# Patient Record
Sex: MALE | Race: WHITE | NOT HISPANIC OR LATINO | URBAN - METROPOLITAN AREA
[De-identification: names, ages, dates, MRNs, and addresses within clinical notes are randomized per-mention and may not be internally consistent; named-entity substitution may affect disease eponyms.]

---

## 2019-08-08 NOTE — PROGRESS NOTES
Alireza C Wood / 29 y.o. / male  Encounter Date: 08/09/2019    ASSESSMENT & PLAN:    Problem List Items Addressed This Visit     None      Visit Diagnoses     Encounter to establish care    -  Primary    Gastroesophageal reflux disease without esophagitis        Relevant Medications    pantoprazole (PROTONIX) 20 MG EC tablet    Other Relevant Orders    Ambulatory Referral to Gastroenterology    Obesity (BMI 35.0-39.9 without comorbidity)            Orders Placed This Encounter   Procedures   • Ambulatory Referral to Gastroenterology     New Medications Ordered This Visit   Medications   • pantoprazole (PROTONIX) 20 MG EC tablet     Sig: Take 1 tablet by mouth Daily.     Dispense:  30 tablet     Refill:  0       Summary/Discussion:  1.  Instructed patient to follow a high-fiber diet approximately 30 g of fiber per day, handout provided and reviewed with patient regarding high-fiber foods and fiber supplements.  2.  Referral given for GI, to assess history of hiatal hernia, reflux, questionable IBS, family history of IBS.  3.  Start Protonix once daily for complaint of heartburn and reflux problems.  I have advised patient that discontinuing caffeine, alcohol, smoking is going to be imperative for his symptoms of reflux, and esophageal irritation.  Also that weight loss is advised for these issues as well.  4.  Discussion and referral placed for weight loss management with [facility].  Reviewed the details of weight loss management and health coaching with the patient regarding clinic outline and benefits.  5. Recommended he follow up with me within the next 1 month for CPE with fasting labs prior: CBC, CMP, A1c, lipid panel, TSH/T4, urine with micro    Return in about 4 weeks (around 9/6/2019) for Annual physical with fasting last prior.  ________________________________________________________________    VITALS:    Visit Vitals  /80   Pulse 81   Temp 96.4 °F (35.8 °C) (Temporal)   Ht 170.2 cm  "(67\")   Wt 104 kg (230 lb)   SpO2 98%   BMI 36.02 kg/m²       BP Readings from Last 3 Encounters:   08/09/19 126/80     Wt Readings from Last 3 Encounters:   08/09/19 104 kg (230 lb)      Body mass index is 36.02 kg/m².    CC: Main reason(s) for today's visit: Establish Care; Abdominal Pain; Diarrhea; Dizziness; and Heartburn      HPI    Patient is a 29 y.o. male who is here to establish care with new PCP and for preventive medicine service visit.  He is a new patient to me.    Concerns today include:     Reflux/heartburn/IBS: Patient complains of recurrent reflux, heartburn symptoms onset greater than 2 years ago.  He reports burning and fullness discomfort in his epigastric area, concurrent with eating or drinking.  Symptoms are exacerbated by laying down or bending forward.  He is not currently exercising.  He does partake in daily alcoholic beverages and caffeine.  He is a current everyday smoker.  He is also overweight.  Is a family history of IBS, and reports that he has a tendency towards loose bowels, with occasional diarrhea, that has been persistent the majority of his life.  He does have a history of a hiatal hernia, with reported vomiting of blood in 2015 for which he was worked up at [facility], and I do not have those records.    Obesity: His BMI is currently 36.  He reports that he has gained significant weight since he stopped taking Adderall over 1 year ago.  He was on Adderall for history of ADHD as diagnosed and managed by psychiatry in the past.  He reports that he does not exercise at this time, and is not conscientious about his food intake.  He is requesting nutritional guidance regarding exercise and healthy eating.    Bipolar 1 disorder: He has an established diagnosis of bipolar disorder, and a family history of bipolar disorder.  He is on Lamictal daily dosing as prescribed by psychiatry.  He is currently in between psychiatrist, is exploring psychiatry establishment with [facility] " psychiatry group.  He reports that he has sufficient Lamictal refills at this time.  He has not had any manic episodes for greater than 5 years, confirmed by girlfriend.  He states that he does have a history of substance abuse greater than 5 years ago.  He appears stable in conversation today in office.  He does have current substance use of caffeine, alcohol, marijuana, and nicotine.    Patient Care Team:  Lesa Edwards APRN as PCP - General (Nurse Practitioner)  ____________________________________________________________________    REVIEW OF SYSTEMS    Review of Systems   Constitutional: Negative.  Negative for activity change, appetite change, diaphoresis, fatigue and unexpected weight change.   Eyes: Negative.    Respiratory: Negative.  Negative for shortness of breath.    Cardiovascular: Negative.  Negative for chest pain, palpitations and leg swelling.   Gastrointestinal: Positive for diarrhea and nausea. Negative for abdominal distention, abdominal pain, anal bleeding, blood in stool, constipation and vomiting.   Endocrine: Negative.  Negative for polydipsia, polyphagia and polyuria.   Genitourinary: Negative.    Musculoskeletal: Negative for back pain.   Skin: Negative.    Allergic/Immunologic: Negative for food allergies.   Neurological: Negative.  Negative for dizziness, weakness and headaches.   Psychiatric/Behavioral: Negative for agitation, dysphoric mood, self-injury, sleep disturbance and suicidal ideas. The patient is not nervous/anxious.        PHYSICAL EXAMINATION    Physical Exam   Constitutional: He is oriented to person, place, and time. He appears well-developed and well-nourished. No distress.   HENT:   Head: Normocephalic and atraumatic.   Eyes: Conjunctivae and EOM are normal. Pupils are equal, round, and reactive to light.   Neck: Normal range of motion.   Cardiovascular: Normal rate.   Pulmonary/Chest: Effort normal.   Abdominal: Soft. Bowel sounds are normal. He exhibits no distension  and no mass. There is no tenderness. There is no rebound and no guarding. No hernia.   Musculoskeletal: Normal range of motion.   Neurological: He is alert and oriented to person, place, and time. Coordination normal.   Skin: Skin is warm and dry.   Psychiatric: He has a normal mood and affect. His behavior is normal. Judgment and thought content normal.   Vitals reviewed.    REVIEWED DATA:    Labs:   No results found for: NA, K, AST, ALT, BUN, CREATININE, EGFRIFNONA, EGFRIFAFRI    No results found for: GLUCOSE, HGBA1C, MICROALBUR    No results found for: LDL, HDL, TRIG, CHOLHDLRATIO    No results found for: TSH, FREET4     No results found for: WBC, HGB, PLT      Imaging:      Medical Tests:      Summary of old records / correspondence / consultant report:      Request outside records:   ______________________________________________________________________    ALLERGIES  No Known Allergies     MEDICATIONS  Current Outpatient Medications on File Prior to Visit   Medication Sig   • lamoTRIgine  MG tablet sustained-release 24 hour Take 200 mg by mouth Daily.     No current facility-administered medications on file prior to visit.        PFSH:     The following portions of the patient's history were reviewed and updated as appropriate: Allergies / Current Medications / Past Medical History / Surgical History / Social History / Family History    PROBLEM LIST   Patient Active Problem List   Diagnosis   • Bipolar affective disorder (CMS/HCC)   • Panic disorder       PAST MEDICAL HISTORY  Past Medical History:   Diagnosis Date   • ADHD (attention deficit hyperactivity disorder)     tried aderall and vyvanse   • Bipolar 1 disorder (CMS/HCC)    • GERD (gastroesophageal reflux disease)        SURGICAL HISTORY  Past Surgical History:   Procedure Laterality Date   • ENDOSCOPY     • WISDOM TOOTH EXTRACTION         SOCIAL HISTORY  Social History     Socioeconomic History   • Marital status:      Spouse name: Not on  "file   • Number of children: Not on file   • Years of education: Not on file   • Highest education level: Not on file   Tobacco Use   • Smoking status: Current Every Day Smoker     Packs/day: 1.00     Years: 5.00     Pack years: 5.00     Types: Cigarettes   • Smokeless tobacco: Never Used   Substance and Sexual Activity   • Alcohol use: Yes     Alcohol/week: 4.2 oz     Types: 7 Shots of liquor per week     Comment: \"four marely\"    • Drug use: Yes     Types: Marijuana   • Sexual activity: Yes     Partners: Female     Birth control/protection: OCP       FAMILY HISTORY  Family History   Problem Relation Age of Onset   • Cancer Mother         ovarian   • Hypertension Father    • Bipolar disorder Father    • Post-traumatic stress disorder Father    • Diabetes Paternal Uncle    • Migraines Sister    • Heart disease Paternal Grandmother    • Colon cancer Neg Hx    • Colon polyps Neg Hx    • Breast cancer Neg Hx    • Prostate cancer Neg Hx    • Testicular cancer Neg Hx        "

## 2019-08-09 ENCOUNTER — OFFICE VISIT (OUTPATIENT)
Dept: INTERNAL MEDICINE | Age: 29
End: 2019-08-09

## 2019-08-09 VITALS
HEIGHT: 67 IN | SYSTOLIC BLOOD PRESSURE: 126 MMHG | BODY MASS INDEX: 36.1 KG/M2 | WEIGHT: 230 LBS | DIASTOLIC BLOOD PRESSURE: 80 MMHG | HEART RATE: 81 BPM | TEMPERATURE: 96.4 F | OXYGEN SATURATION: 98 %

## 2019-08-09 DIAGNOSIS — Z76.89 ENCOUNTER TO ESTABLISH CARE: Primary | ICD-10-CM

## 2019-08-09 DIAGNOSIS — K21.9 GASTROESOPHAGEAL REFLUX DISEASE WITHOUT ESOPHAGITIS: ICD-10-CM

## 2019-08-09 DIAGNOSIS — E66.9 OBESITY (BMI 35.0-39.9 WITHOUT COMORBIDITY): ICD-10-CM

## 2019-08-09 PROCEDURE — 99204 OFFICE O/P NEW MOD 45 MIN: CPT | Performed by: NURSE PRACTITIONER

## 2019-08-09 RX ORDER — PANTOPRAZOLE SODIUM 20 MG/1
20 TABLET, DELAYED RELEASE ORAL DAILY
Qty: 30 TABLET | Refills: 0 | Status: SHIPPED | OUTPATIENT
Start: 2019-08-09 | End: 2021-01-11 | Stop reason: SDUPTHER

## 2019-08-09 RX ORDER — LAMOTRIGINE 200 MG/1
200 TABLET, EXTENDED RELEASE ORAL DAILY
COMMUNITY
End: 2020-06-30 | Stop reason: SDUPTHER

## 2020-06-30 ENCOUNTER — OFFICE VISIT (OUTPATIENT)
Dept: INTERNAL MEDICINE | Age: 30
End: 2020-06-30

## 2020-06-30 VITALS
OXYGEN SATURATION: 98 % | DIASTOLIC BLOOD PRESSURE: 70 MMHG | HEART RATE: 72 BPM | HEIGHT: 67 IN | SYSTOLIC BLOOD PRESSURE: 108 MMHG | BODY MASS INDEX: 37.2 KG/M2 | TEMPERATURE: 97.1 F | WEIGHT: 237 LBS

## 2020-06-30 DIAGNOSIS — F10.10 DYSFUNCTIONAL ALCOHOL USE: ICD-10-CM

## 2020-06-30 DIAGNOSIS — M79.89 BILATERAL SWELLING OF FEET: ICD-10-CM

## 2020-06-30 DIAGNOSIS — L03.116 CELLULITIS OF BOTH FEET: Primary | ICD-10-CM

## 2020-06-30 DIAGNOSIS — L03.115 CELLULITIS OF BOTH FEET: Primary | ICD-10-CM

## 2020-06-30 PROCEDURE — 99213 OFFICE O/P EST LOW 20 MIN: CPT | Performed by: NURSE PRACTITIONER

## 2020-06-30 RX ORDER — DEXTROAMPHETAMINE SACCHARATE, AMPHETAMINE ASPARTATE MONOHYDRATE, DEXTROAMPHETAMINE SULFATE AND AMPHETAMINE SULFATE 5; 5; 5; 5 MG/1; MG/1; MG/1; MG/1
CAPSULE, EXTENDED RELEASE ORAL DAILY
COMMUNITY

## 2020-06-30 RX ORDER — VENLAFAXINE 75 MG/1
75 TABLET ORAL DAILY
COMMUNITY
Start: 2020-05-18

## 2020-06-30 RX ORDER — ARIPIPRAZOLE 5 MG/1
5 TABLET ORAL DAILY
COMMUNITY
Start: 2020-05-18 | End: 2021-05-24

## 2020-06-30 RX ORDER — LAMOTRIGINE 200 MG/1
200 TABLET ORAL 2 TIMES DAILY
COMMUNITY
Start: 2020-05-18

## 2020-06-30 NOTE — PATIENT INSTRUCTIONS
Cellulitis, Adult    Cellulitis is a skin infection. The infected area is usually warm, red, swollen, and tender. This condition occurs most often in the arms and lower legs. The infection can travel to the muscles, blood, and underlying tissue and become serious. It is very important to get treated for this condition.  What are the causes?  Cellulitis is caused by bacteria. The bacteria enter through a break in the skin, such as a cut, burn, insect bite, open sore, or crack.  What increases the risk?  This condition is more likely to occur in people who:  · Have a weak body defense system (immune system).  · Have open wounds on the skin, such as cuts, burns, bites, and scrapes. Bacteria can enter the body through these open wounds.  · Are older than 60 years of age.  · Have diabetes.  · Have a type of long-lasting (chronic) liver disease (cirrhosis) or kidney disease.  · Are obese.  · Have a skin condition such as:  ? Itchy rash (eczema).  ? Slow movement of blood in the veins (venous stasis).  ? Fluid buildup below the skin (edema).  · Have had radiation therapy.  · Use IV drugs.  What are the signs or symptoms?  Symptoms of this condition include:  · Redness, streaking, or spotting on the skin.  · Swollen area of the skin.  · Tenderness or pain when an area of the skin is touched.  · Warm skin.  · A fever.  · Chills.  · Blisters.  How is this diagnosed?  This condition is diagnosed based on a medical history and physical exam. You may also have tests, including:  · Blood tests.  · Imaging tests.  How is this treated?  Treatment for this condition may include:  · Medicines, such as antibiotic medicines or medicines to treat allergies (antihistamines).  · Supportive care, such as rest and application of cold or warm cloths (compresses) to the skin.  · Hospital care, if the condition is severe.  The infection usually starts to get better within 1-2 days of treatment.  Follow these instructions at  home:    Medicines  · Take over-the-counter and prescription medicines only as told by your health care provider.  · If you were prescribed an antibiotic medicine, take it as told by your health care provider. Do not stop taking the antibiotic even if you start to feel better.  General instructions  · Drink enough fluid to keep your urine pale yellow.  · Do not touch or rub the infected area.  · Raise (elevate) the infected area above the level of your heart while you are sitting or lying down.  · Apply warm or cold compresses to the affected area as told by your health care provider.  · Keep all follow-up visits as told by your health care provider. This is important. These visits let your health care provider make sure a more serious infection is not developing.  Contact a health care provider if:  · You have a fever.  · Your symptoms do not begin to improve within 1-2 days of starting treatment.  · Your bone or joint underneath the infected area becomes painful after the skin has healed.  · Your infection returns in the same area or another area.  · You notice a swollen bump in the infected area.  · You develop new symptoms.  · You have a general ill feeling (malaise) with muscle aches and pains.  Get help right away if:  · Your symptoms get worse.  · You feel very sleepy.  · You develop vomiting or diarrhea that persists.  · You notice red streaks coming from the infected area.  · Your red area gets larger or turns dark in color.  These symptoms may represent a serious problem that is an emergency. Do not wait to see if the symptoms will go away. Get medical help right away. Call your local emergency services (911 in the [place] Do not drive yourself to the hospital.  Summary  · Cellulitis is a skin infection. This condition occurs most often in the arms and lower legs.  · Treatment for this condition may include medicines, such as antibiotic medicines or antihistamines.  · Take over-the-counter and prescription  medicines only as told by your health care provider. If you were prescribed an antibiotic medicine, do not stop taking the antibiotic even if you start to feel better.  · Contact a health care provider if your symptoms do not begin to improve within 1-2 days of starting treatment or your symptoms get worse.  · Keep all follow-up visits as told by your health care provider. This is important. These visits let your health care provider make sure that a more serious infection is not developing.  This information is not intended to replace advice given to you by your health care provider. Make sure you discuss any questions you have with your health care provider.  Document Released: 09/27/2006 Document Revised: 05/09/2019 Document Reviewed: 05/09/2019  Elsevier Patient Education © 2020 [employer]

## 2020-06-30 NOTE — PROGRESS NOTES
"[facility] INTERNAL MEDICINE  LINA REID Wood / 30 y.o. / male  06/30/2020    VITALS    Visit Vitals  /70   Pulse 72   Temp 97.1 °F (36.2 °C) (Temporal)   Ht 170.2 cm (67.01\")   Wt 108 kg (237 lb)   SpO2 98%   BMI 37.11 kg/m²       BP Readings from Last 3 Encounters:   06/30/20 108/70   08/09/19 126/80     Wt Readings from Last 3 Encounters:   06/30/20 108 kg (237 lb)   08/09/19 104 kg (230 lb)      Body mass index is 37.11 kg/m².    CC:  Main reason(s) for today's visit: Establish Care; Foot Swelling; and Wound Check      HPI:     Date of emergency department encounter: 6/26/20  Emergency department: [facility]  Principle Dx: Cellulitis of feet?   Secondary Dx:   History prior to ED visit: Patient reports recent abrupt swelling of bilateral feet with pain 5 days ago. He recently returned back from [place] 2 days prior (12 hour car ride without stops). He reports wearing ill-fitting sandals for many days prior to onset of symptoms.   Evaluation/Treatment: He was seen at [facility] ER and reports had labs done (normal?) and treated with antibiotic (unknown).   Course: Overall, he reports swelling in feet is improving. He is a poor historian, cannot recall which ER he went to. He admits to excessive ETOH use, reports stopped drinking completely 4 days ago. He is seen by psychiatry on a monthly basis, states they are aware of his ETOH use.   No fever, chills. History of DVT in mother. No personal history DVT.  He denies any leg pain, leg swelling, SOA.     Patient Care Team:  Lina Whitney APRN as PCP - General (Internal Medicine)  ____________________________________________________________________    ASSESSMENT & PLAN:      No orders of the defined types were placed in this encounter.    No orders of the defined types were placed in this encounter.      Summary/Discussion:    1. Cellulitis of both feet  Recommended continue antibiotic. Continue soap/water cleaning of " wounds on feet.   Follow up as needed for recurrent swelling, redness, pain, fever, etc.     2. Bilateral swelling of feet  Likely a combination of cellulitis and prolonged car ride.  Recommended use of compression stockings for any future long car travel, stops every few hours to walk and stretch to prevent DVT.     3. Dysfunctional alcohol use  Patient has been sober for 4 days now.  Recommend follow-up as scheduled with psychiatry. Continue abstinence from ETOH.         Return in about 6 months (around 12/30/2020) for Annual physical with fasting labs 1 week prior .    Future Appointments   Date Time Provider Department Center   1/4/2021  8:00 AM [id number] None   1/11/2021  8:00 AM Lina Whitney APRN [facility] None     ____________________________________________________________________    REVIEW OF SYSTEMS    Review of Systems   Constitutional: Negative for chills, fatigue and fever.   Musculoskeletal: Positive for joint swelling.         PHYSICAL EXAMINATION    Physical Exam   Constitutional: He is oriented to person, place, and time. Vital signs are normal. He appears well-developed and well-nourished. He is cooperative.   Neurological: He is alert and oriented to person, place, and time. He is not disoriented.   Skin:   Open single wounds to bilateral dorsal surface of feet. Only mild erythema surrounding, no drainage.   Mild swelling Bilateral feet.     No significant swelling of lower legs.   Negative bilateral Jg's sign.    Psychiatric: He has a normal mood and affect. His speech is normal and behavior is normal. Judgment and thought content normal. He is not actively hallucinating. Cognition and memory are impaired. He is attentive.   Nursing note and vitals reviewed.        REVIEWED DATA:    Labs:   No visits with results within 14 Day(s) from this visit.   Latest known visit with results is:   No results found for any previous visit.         Imaging:   No results  found.       Medical Tests:         Summary of old records / correspondence / consultant report:   Report not available currently for review    Request outside records:       ALLERGIES  No Known Allergies     MEDICATIONS  Current Outpatient Medications on File Prior to Visit   Medication Sig   • amphetamine-dextroamphetamine XR (Adderall XR) 20 MG 24 hr capsule Take by mouth Daily   • ARIPiprazole (ABILIFY) 5 MG tablet Take 5 mg by mouth Daily.   • lamoTRIgine (LaMICtal) 200 MG tablet Take 200 mg by mouth 2 (Two) Times a Day.   • pantoprazole (PROTONIX) 20 MG EC tablet Take 1 tablet by mouth Daily.   • venlafaxine (EFFEXOR) 75 MG tablet Take 75 mg by mouth Daily.   • [DISCONTINUED] lamoTRIgine  MG tablet sustained-release 24 hour Take 200 mg by mouth Daily.     No current facility-administered medications on file prior to visit.        PFSH:     The following portions of the patient's history were reviewed and updated as appropriate: Allergies / Current Medications / Past Medical History / Surgical History / Social History / Family History    PROBLEM LIST   Patient Active Problem List   Diagnosis   • Bipolar affective disorder (CMS/[facility])   • Panic disorder   • Dysfunctional alcohol use       PAST MEDICAL HISTORY  Past Medical History:   Diagnosis Date   • ADHD (attention deficit hyperactivity disorder)     tried aderall and vyvanse   • Bipolar 1 disorder (CMS/[facility])    • GERD (gastroesophageal reflux disease)        SURGICAL HISTORY  Past Surgical History:   Procedure Laterality Date   • ENDOSCOPY     • WISDOM TOOTH EXTRACTION         SOCIAL HISTORY  Social History     Socioeconomic History   • Marital status:      Spouse name: Not on file   • Number of children: Not on file   • Years of education: Not on file   • Highest education level: Not on file   Tobacco Use   • Smoking status: Current Every Day Smoker     Packs/day: 1.00     Years: 5.00     Pack years: 5.00     Types: Cigarettes   • Smokeless  "tobacco: Never Used   Substance and Sexual Activity   • Alcohol use: Yes     Alcohol/week: 7.0 standard drinks     Types: 7 Shots of liquor per week     Comment: \"four marely\"    • Drug use: Yes     Types: Marijuana   • Sexual activity: Yes     Partners: Female     Birth control/protection: OCP       FAMILY HISTORY  Family History   Problem Relation Age of Onset   • Cancer Mother         ovarian   • Hypertension Father    • Bipolar disorder Father    • Post-traumatic stress disorder Father    • Alcohol abuse Father    • Diabetes Paternal Uncle    • Migraines Sister    • Heart disease Paternal Grandmother    • Colon cancer Neg Hx    • Colon polyps Neg Hx    • Breast cancer Neg Hx    • Prostate cancer Neg Hx    • Testicular cancer Neg Hx          **Ameliaon Disclaimer:   Much of this encounter note is an electronic transcription/translation of spoken language to printed text. The electronic translation of spoken language may permit erroneous, or at times, nonsensical words or phrases to be inadvertently transcribed. Although I have reviewed the note for such errors, some may still exist.     "

## 2020-12-29 DIAGNOSIS — Z00.00 ANNUAL PHYSICAL EXAM: Primary | ICD-10-CM

## 2021-01-11 ENCOUNTER — OFFICE VISIT (OUTPATIENT)
Dept: INTERNAL MEDICINE | Age: 31
End: 2021-01-11

## 2021-01-11 VITALS
OXYGEN SATURATION: 99 % | SYSTOLIC BLOOD PRESSURE: 142 MMHG | DIASTOLIC BLOOD PRESSURE: 88 MMHG | WEIGHT: 246 LBS | TEMPERATURE: 96.4 F | BODY MASS INDEX: 38.61 KG/M2 | HEART RATE: 95 BPM | HEIGHT: 67 IN

## 2021-01-11 DIAGNOSIS — K21.9 GASTROESOPHAGEAL REFLUX DISEASE WITHOUT ESOPHAGITIS: ICD-10-CM

## 2021-01-11 DIAGNOSIS — Z00.00 ROUTINE HEALTH MAINTENANCE: ICD-10-CM

## 2021-01-11 DIAGNOSIS — F10.10 DYSFUNCTIONAL ALCOHOL USE: Primary | ICD-10-CM

## 2021-01-11 LAB
25(OH)D3+25(OH)D2 SERPL-MCNC: 24.4 NG/ML (ref 30–100)
ALBUMIN SERPL-MCNC: 4.2 G/DL (ref 3.5–5.2)
ALBUMIN/GLOB SERPL: 2.5 G/DL
ALP SERPL-CCNC: 132 U/L (ref 39–117)
ALT SERPL-CCNC: 50 U/L (ref 1–41)
AST SERPL-CCNC: 39 U/L (ref 1–40)
BASOPHILS # BLD AUTO: 0.04 10*3/MM3 (ref 0–0.2)
BASOPHILS NFR BLD AUTO: 0.5 % (ref 0–1.5)
BILIRUB SERPL-MCNC: 0.2 MG/DL (ref 0–1.2)
BUN SERPL-MCNC: 13 MG/DL (ref 6–20)
BUN/CREAT SERPL: 16 (ref 7–25)
CALCIUM SERPL-MCNC: 9.3 MG/DL (ref 8.6–10.5)
CHLORIDE SERPL-SCNC: 103 MMOL/L (ref 98–107)
CHOLEST SERPL-MCNC: 192 MG/DL (ref 0–200)
CHOLEST/HDLC SERPL: 3.56 {RATIO}
CO2 SERPL-SCNC: 28.6 MMOL/L (ref 22–29)
CREAT SERPL-MCNC: 0.81 MG/DL (ref 0.76–1.27)
EOSINOPHIL # BLD AUTO: 0 10*3/MM3 (ref 0–0.4)
EOSINOPHIL NFR BLD AUTO: 0 % (ref 0.3–6.2)
ERYTHROCYTE [DISTWIDTH] IN BLOOD BY AUTOMATED COUNT: 12.5 % (ref 12.3–15.4)
FOLATE SERPL-MCNC: 2.12 NG/ML (ref 4.78–24.2)
GLOBULIN SER CALC-MCNC: 1.7 GM/DL
GLUCOSE SERPL-MCNC: 100 MG/DL (ref 65–99)
HBA1C MFR BLD: 5 % (ref 4.8–5.6)
HCT VFR BLD AUTO: 41.7 % (ref 37.5–51)
HDLC SERPL-MCNC: 54 MG/DL (ref 40–60)
HGB BLD-MCNC: 14.7 G/DL (ref 13–17.7)
IMM GRANULOCYTES # BLD AUTO: 0.04 10*3/MM3 (ref 0–0.05)
IMM GRANULOCYTES NFR BLD AUTO: 0.5 % (ref 0–0.5)
LDLC SERPL CALC-MCNC: 113 MG/DL (ref 0–100)
LYMPHOCYTES # BLD AUTO: 1.45 10*3/MM3 (ref 0.7–3.1)
LYMPHOCYTES NFR BLD AUTO: 18.6 % (ref 19.6–45.3)
MCH RBC QN AUTO: 31.1 PG (ref 26.6–33)
MCHC RBC AUTO-ENTMCNC: 35.3 G/DL (ref 31.5–35.7)
MCV RBC AUTO: 88.2 FL (ref 79–97)
MONOCYTES # BLD AUTO: 0.66 10*3/MM3 (ref 0.1–0.9)
MONOCYTES NFR BLD AUTO: 8.5 % (ref 5–12)
NEUTROPHILS # BLD AUTO: 5.59 10*3/MM3 (ref 1.7–7)
NEUTROPHILS NFR BLD AUTO: 71.9 % (ref 42.7–76)
NRBC BLD AUTO-RTO: 0 /100 WBC (ref 0–0.2)
PLATELET # BLD AUTO: 277 10*3/MM3 (ref 140–450)
POTASSIUM SERPL-SCNC: 4 MMOL/L (ref 3.5–5.2)
PROT SERPL-MCNC: 5.9 G/DL (ref 6–8.5)
RBC # BLD AUTO: 4.73 10*6/MM3 (ref 4.14–5.8)
SODIUM SERPL-SCNC: 140 MMOL/L (ref 136–145)
TRIGL SERPL-MCNC: 144 MG/DL (ref 0–150)
TSH SERPL DL<=0.005 MIU/L-ACNC: 3.76 UIU/ML (ref 0.27–4.2)
VIT B12 SERPL-MCNC: 501 PG/ML (ref 211–946)
VLDLC SERPL CALC-MCNC: 25 MG/DL (ref 5–40)
WBC # BLD AUTO: 7.78 10*3/MM3 (ref 3.4–10.8)

## 2021-01-11 PROCEDURE — 99214 OFFICE O/P EST MOD 30 MIN: CPT | Performed by: NURSE PRACTITIONER

## 2021-01-11 RX ORDER — PANTOPRAZOLE SODIUM 20 MG/1
20 TABLET, DELAYED RELEASE ORAL DAILY
Qty: 90 TABLET | Refills: 1 | Status: SHIPPED | OUTPATIENT
Start: 2021-01-11

## 2021-01-11 RX ORDER — PROPRANOLOL HYDROCHLORIDE 20 MG/1
TABLET ORAL
COMMUNITY
Start: 2020-11-06

## 2021-01-11 NOTE — PATIENT INSTRUCTIONS
Alcohol Abuse and Dependence Information, Adult  Alcohol is a widely available drug. People drink alcohol in different amounts. People who drink alcohol very often and in large amounts often have problems during and after drinking. They may develop what is called an alcohol use disorder. There are two main types of alcohol use disorders:  · Alcohol abuse. This is when you use alcohol too much or too often. You may use alcohol to make yourself feel happy or to reduce stress. You may have a hard time setting a limit on the amount you drink.  · Alcohol dependence. This is when you use alcohol consistently for a period of time, and your body changes as a result. This can make it hard to stop drinking because you may start to feel sick or feel different when you do not use alcohol. These symptoms are known as withdrawal.  How can alcohol abuse and dependence affect me?  Alcohol abuse and dependence can have a negative effect on your life. Drinking too much can lead to addiction. You may feel like you need alcohol to function normally. You may drink alcohol before work in the morning, during the day, or as soon as you get home from work in the evening. These actions can result in:  · Poor work performance.  · Job loss.  · Financial problems.  · Car crashes or criminal charges from driving after drinking alcohol.  · Problems in your relationships with friends and family.  · Losing the trust and respect of coworkers, friends, and family.  Drinking heavily over a long period of time can permanently damage your body and brain, and can cause lifelong health issues, such as:  · Damage to your liver or pancreas.  · Heart problems, high blood pressure, or stroke.  · Certain cancers.  · Decreased ability to fight infections.  · Brain or nerve damage.  · Depression.  · Early (premature) death.  If you are careless or you crave alcohol, it is easy to drink more than your body can handle (overdose). Alcohol overdose is a serious  situation that requires hospitalization. It may lead to permanent injuries or death.  What can increase my risk?  · Having a family history of alcohol abuse.  · Having depression or other mental health conditions.  · Beginning to drink at an early age.  · Binge drinking often.  · Experiencing trauma, stress, and an unstable home life during childhood.  · Spending time with people who drink often.  What actions can I take to prevent or manage alcohol abuse and dependence?  · Do not drink alcohol if:  ? Your health care provider tells you not to drink.  ? You are pregnant, may be pregnant, or are planning to become pregnant.  · If you drink alcohol:  ? Limit how much you use to:  § 0-1 drink a day for women.  § 0-2 drinks a day for men.  ? Be aware of how much alcohol is in your drink. In the [place] one drink equals one 12 oz bottle of beer (355 mL), one 5 oz glass of wine (148 mL), or one 1½ oz glass of hard liquor (44 mL).  · Stop drinking if you have been drinking too much. This can be very hard to do if you are used to abusing alcohol. If you begin to have withdrawal symptoms, talk with your health care provider or a person that you trust. These symptoms may include anxiety, shaky hands, headache, nausea, sweating, or not being able to sleep.  · Choose to drink nonalcoholic beverages in social gatherings and places where there may be alcohol.  Activity  · Spend more time on activities that you enjoy that do not involve alcohol, like hobbies or exercise.  · Find healthy ways to cope with stress, such as exercise, meditation, or spending time with people you care about.  General information  · Talk to your family, coworkers, and friends about supporting you in your efforts to stop drinking. If they drink, ask them not to drink around you. Spend more time with people who do not drink alcohol.  · If you think that you have an alcohol dependency problem:  ? Tell friends or family about your concerns.  ? Talk with your  health care provider or another health professional about where to get help.  ? Work with a therapist and a chemical dependency counselor.  ? Consider joining a support group for people who struggle with alcohol abuse and dependence.  Where to find support    · Your health care provider.  · SMART Recovery: www.smartrecovery.org  Therapy and support groups  · Local treatment centers or chemical dependency counselors.  · Local AA groups in your community: www.aa.org  Where to find more information  · Centers for Disease Control and Prevention: www.cdc.gov  · National Ong on Alcohol Abuse and Alcoholism: www.niaaa.nih.gov  · Alcoholics Anonymous (AA): www.aa.org  Contact a health care provider if:  · You drank more or for longer than you intended on more than one occasion.  · You tried to stop drinking or to cut back on how much you drink, but you were not able to.  · You often drink to the point of vomiting or passing out.  · You want to drink so badly that you cannot think about anything else.  · You have problems in your life due to drinking, but you continue to drink.  · You keep drinking even though you feel anxious, depressed, or have experienced memory loss.  · You have stopped doing the things you used to enjoy in order to drink.  · You have to drink more than you used to in order to get the effect you want.  · You experience anxiety, sweating, nausea, shakiness, and trouble sleeping when you try to stop drinking.  Get help right away if:  · You have thoughts about hurting yourself or others.  · You have serious withdrawal symptoms, including:  ? Confusion.  ? Racing heart.  ? High blood pressure.  ? Fever.  If you ever feel like you may hurt yourself or others, or have thoughts about taking your own life, get help right away. You can go to your nearest emergency department or call:  · Your local emergency services (911 in the [place]  · A suicide crisis helpline, such as the National Suicide Prevention  [facility] at 1-609.122.7462. This is open 24 hours a day.  Summary  · Alcohol abuse and dependence can have a negative effect on your life. Drinking too much or too often can lead to addiction.  · If you drink alcohol, limit how much you use.  · If you are having trouble keeping your drinking under control, find ways to change your behavior. Hobbies, calming activities, exercise, or support groups can help.  · If you feel you need help with changing your drinking habits, talk with your health care provider, a good friend, or a therapist, or go to an AA group.  This information is not intended to replace advice given to you by your health care provider. Make sure you discuss any questions you have with your health care provider.  Document Revised: 04/07/2020 Document Reviewed: 02/25/2020  Elsevier Patient Education © 2020 [employer] Inc.

## 2021-01-11 NOTE — PROGRESS NOTES
[facility] [place] [facility]NAL MEDICINE  LINA Lay C Wood / 30 y.o. / male  01/11/2021    CC: Annual Exam      HPI:      Alireza presents for annual health maintenance visit.    · Last health maintenance visit: {TAYLOR DENNEY WHEN (Optional):21262}  · General health: {GOOD/FAIR/POOR/EXCELLENT:95974}  · Lifestyle:  · Attempting to lose weight?: {JOJENNIFER DENNEY YES/NO (Optional):21261}  · Diet: {JOSE good/fair:42135}  · Exercise: {JOSE good/fair:55610}  · Tobacco: {Tobacco use:21269}   · Alcohol: {Alcohol consumption:10400}  · Work: {TAYLOR DENNEY WORK:21270}  · Reproductive health:  · Sexually active?: {TAYLOR DENNEY YES/NO (Optional):21261}  · Concern for STD?: {TAYLOR DENNEY YES/NO:21261}  · Sexual problems?: {JOSE DENNEY SEXUALPROBLEM:13690}   · Sees Urologist?: {TAYLOR DENNEY YES/NO:49629}  · Depression Screening:      PHQ-2/PHQ-9 Depression Screening 1/11/2021   Little interest or pleasure in doing things 0   Feeling down, depressed, or hopeless 2   Total Score 2         PHQ-2: {JOSE DENNEY PHQ-2 SCORE:48873}     PHQ-9: {JOSE DENNEY PHQ-9 SCORE:28527}    Patient Care Team:  Lina Whitney APRN as PCP - General (Internal Medicine)  ______________________________________________________________________    ALLERGIES  No Known Allergies     MEDICATIONS  Current Outpatient Medications on File Prior to Visit   Medication Sig   • amphetamine-dextroamphetamine XR (Adderall XR) 20 MG 24 hr capsule Take by mouth Daily   • ARIPiprazole (ABILIFY) 5 MG tablet Take 5 mg by mouth Daily.   • lamoTRIgine (LaMICtal) 200 MG tablet Take 200 mg by mouth 2 (Two) Times a Day.   • pantoprazole (PROTONIX) 20 MG EC tablet Take 1 tablet by mouth Daily.   • propranolol (INDERAL) 20 MG tablet TK 1 T PO TID   • venlafaxine (EFFEXOR) 75 MG tablet Take 75 mg by mouth Daily.     No current facility-administered medications on file prior to visit.        PFSH:     The following portions of the patient's history were reviewed and updated as appropriate: Allergies / Current Medications / Past Medical  "History / Surgical History / Social History / Family History    PROBLEM LIST   Patient Active Problem List   Diagnosis   • Bipolar affective disorder (CMS/HCC)   • Panic disorder   • Dysfunctional alcohol use       PAST MEDICAL HISTORY  Past Medical History:   Diagnosis Date   • ADHD (attention deficit hyperactivity disorder)     tried aderall and vyvanse   • Bipolar 1 disorder (CMS/HCC)    • GERD (gastroesophageal reflux disease)        SURGICAL HISTORY  Past Surgical History:   Procedure Laterality Date   • ENDOSCOPY     • WISDOM TOOTH EXTRACTION         SOCIAL HISTORY  Social History     Socioeconomic History   • Marital status:      Spouse name: Not on file   • Number of children: Not on file   • Years of education: Not on file   • Highest education level: Not on file   Tobacco Use   • Smoking status: Current Every Day Smoker     Packs/day: 1.00     Years: 5.00     Pack years: 5.00     Types: Cigarettes   • Smokeless tobacco: Never Used   Substance and Sexual Activity   • Alcohol use: Yes     Alcohol/week: 7.0 standard drinks     Types: 7 Shots of liquor per week     Comment: \"four marely\"    • Drug use: Yes     Types: Marijuana   • Sexual activity: Yes     Partners: Female     Birth control/protection: OCP       FAMILY HISTORY  Family History   Problem Relation Age of Onset   • Cancer Mother         ovarian   • Hypertension Father    • Bipolar disorder Father    • Post-traumatic stress disorder Father    • Alcohol abuse Father    • Diabetes Paternal Uncle    • Migraines Sister    • Heart disease Paternal Grandmother    • Colon cancer Neg Hx    • Colon polyps Neg Hx    • Breast cancer Neg Hx    • Prostate cancer Neg Hx    • Testicular cancer Neg Hx        IMMUNIZATION HISTORY    There is no immunization history on file for this patient.    ______________________________________________________________________    REVIEW OF SYSTEMS    Review of Systems      VITALS:    Visit Vitals  /88   Pulse 95   Temp " "96.4 °F (35.8 °C) (Temporal)   Ht 170.2 cm (67\")   Wt 112 kg (246 lb)   SpO2 99%   BMI 38.53 kg/m²       BP Readings from Last 3 Encounters:   01/11/21 142/88   06/30/20 108/70   08/09/19 126/80     Wt Readings from Last 3 Encounters:   01/11/21 112 kg (246 lb)   06/30/20 108 kg (237 lb)   08/09/19 104 kg (230 lb)      Body mass index is 38.53 kg/m².    PHYSICAL EXAMINATION    Physical Exam      REVIEWED DATA    Labs:    No results found for: NA, K, AST, ALT, BUN, CREATININE, EGFRIFNONA, EGFRIFAFRI    No results found for: GLUCOSE, HGBA1C, TSH, FREET4    No results found for: PSA, TESTOSTEROTT    No results found for: LDL, HDL, TRIG, CHOLHDLRATIO    No components found for: PDIV326C    No results found for: WBC, HGB, MCV, PLT    No results found for: PROTEIN, GLUCOSEU, BLOODU, NITRITEU, LEUKOCYTESUR     No results found for: HEPCVIRUSABY    Imaging:           Medical Tests:       ______________________________________________________________________    ASSESSMENT & PLAN    ANNUAL WELLNESS EXAM / PHYSICAL     Other medical problems addressed today:  @A1@    Summary/Discussion:     ·       No follow-ups on file.    Future Appointments   Date Time Provider Department Center   1/11/2021  8:00 AM Lina Whitney, [facility]         HEALTHCARE MAINTENANCE ISSUES:    Cancer Screening:  · Colon: Initial/Next screening at age: {TAYLOR DENNEY AGE SCREEN:21263}  · Repeat colon cancer screening: {TAYLOR DENNEY YEARS INTERVAL:21264}  · Prostate: {TAYLOR DENNEY PROSTATE:21265}  · Testicular: Recommended monthly self exam  · Skin: Monthly self skin examination, annual exam by health professional  · Lung:   · Other:    Screening Labs & Tests:  · Lab results reviewed & discussed with with patient or orders placed today.  · EKG:  · Vascular Screening:   · DEXA (75+ or risk factors):   · HEP C (If born [id number] or risk factors): {JOSE DENNEY [facility]:04186::\"Not indicated\"}    Immunization/Vaccinations (to be given today unless deferred by " patient)  · Influenza: {INFLUENZA VACCINE (Optional):52555}  · Hepatitis A: {TAYLOR SL YES/NO IMMUNIZ:21267}  · Tetanus/Pertussis: {JOK SL YES/NO IMMUNIZ:21267}  · Pneumovax: {JOK SL YES/NO IMMUNIZ:21267}  · Prevnar 13: {JOK SL YES/NO IMMUNIZ:21267}  · Shingles: {JOK SL IMMUNIZATION ZOSTER:14553}  · Other:     Lifestyle Counseling:  · Lifestyle Modifications: {Lifestyle:73826}  · Safety Issues: Always wear seatbelt, Avoid texting while driving   · Use sunscreen, regular skin examination  · Recommended annual dental/vision examination.  · Emotional/Stress/Sleep: Reviewed and  given when appropriate      Health Maintenance   Topic Date Due   • Pneumococcal Vaccine 0-64 (1 of 1 - PPSV23) 06/25/1997   • TDAP/TD VACCINES (1 - Tdap) 06/25/2010   • HEPATITIS C SCREENING  08/08/2019   • INFLUENZA VACCINE  08/01/2020   • ANNUAL PHYSICAL  01/12/2022   • MENINGOCOCCAL VACCINE  Aged Out         **Dragon Disclaimer:   Much of this encounter note is an electronic transcription/translation of spoken language to printed text. The electronic translation of spoken language may permit erroneous, or at times, nonsensical words or phrases to be inadvertently transcribed. Although I have reviewed the note for such errors, some may still exist.

## 2021-01-11 NOTE — PROGRESS NOTES
[facility] [place] [facility]NAL MEDICINE  Lina REID Wood / 30 y.o. / male  01/11/2021      ASSESSMENT & PLAN:    Problem List Items Addressed This Visit        Mental Health    Dysfunctional alcohol use - Primary    Relevant Medications    amphetamine-dextroamphetamine XR (Adderall XR) 20 MG 24 hr capsule    ARIPiprazole (ABILIFY) 5 MG tablet    venlafaxine (EFFEXOR) 75 MG tablet    Other Relevant Orders    CBC & Differential    Vitamin B12    Folate      Other Visit Diagnoses     Gastroesophageal reflux disease without esophagitis        Relevant Medications    pantoprazole (Protonix) 20 MG EC tablet    Routine health maintenance        Relevant Orders    CBC & Differential    Comprehensive Metabolic Panel    Hemoglobin A1c    Lipid Panel With / Chol / HDL Ratio    TSH Rfx On Abnormal To Free T4    Vitamin D 25 Hydroxy        Orders Placed This Encounter   Procedures   • Comprehensive Metabolic Panel   • Hemoglobin A1c   • Lipid Panel With / Chol / HDL Ratio   • TSH Rfx On Abnormal To Free T4   • Vitamin D 25 Hydroxy   • Vitamin B12   • Folate   • CBC & Differential     New Medications Ordered This Visit   Medications   • pantoprazole (Protonix) 20 MG EC tablet     Sig: Take 1 tablet by mouth Daily.     Dispense:  90 tablet     Refill:  1       Summary/Discussion:    1. Dysfunctional alcohol use  Patient has complex history of alcohol use disorder combined with bipolar disorder.  Advised patient he needs to make his psychiatrist aware of his current heavy drinking, especially related to his multiple psychiatric medications. He agrees to contact psychiatrist later today.     He is not agreeable to Alcoholics Anonymous as he does not like the Jew undertone of the organization.  I did supply him with a handout of different resources here in [place] that he can follow-up with for alcohol treatment disorder.    Advised patient against quitting cold turkey due to risk of withdrawal, which can be  "life-threatening.     Check labs today for baseline.   Ultimately, his complex psychiatric background coupled with ETOH use needs to be managed by his psychiatric professional.     - CBC & Differential  - Vitamin B12  - Folate    2. Gastroesophageal reflux disease without esophagitis  History hiatal hernia noted on EGD years ago.   Having increased reflux, advised patient likely due to ETOH use.     - pantoprazole (Protonix) 20 MG EC tablet; Take 1 tablet by mouth Daily.  Dispense: 90 tablet; Refill: 1    3. Routine health maintenance    - CBC & Differential  - Comprehensive Metabolic Panel  - Hemoglobin A1c  - Lipid Panel With / Chol / HDL Ratio  - TSH Rfx On Abnormal To Free T4  - Vitamin D 25 Hydroxy        Return in about 4 months (around 5/11/2021) for Next scheduled follow up.    ____________________________________________________________________    MEDICATIONS  Current Outpatient Medications   Medication Sig Dispense Refill   • amphetamine-dextroamphetamine XR (Adderall XR) 20 MG 24 hr capsule Take by mouth Daily     • ARIPiprazole (ABILIFY) 5 MG tablet Take 5 mg by mouth Daily.     • lamoTRIgine (LaMICtal) 200 MG tablet Take 200 mg by mouth 2 (Two) Times a Day.     • pantoprazole (Protonix) 20 MG EC tablet Take 1 tablet by mouth Daily. 90 tablet 1   • propranolol (INDERAL) 20 MG tablet TK 1 T PO TID     • venlafaxine (EFFEXOR) 75 MG tablet Take 75 mg by mouth Daily.       No current facility-administered medications for this visit.           VITALS:    Visit Vitals  /88   Pulse 95   Temp 96.4 °F (35.8 °C) (Temporal)   Ht 170.2 cm (67\")   Wt 112 kg (246 lb)   SpO2 99%   BMI 38.53 kg/m²       BP Readings from Last 3 Encounters:   01/11/21 142/88   06/30/20 108/70   08/09/19 126/80     Wt Readings from Last 3 Encounters:   01/11/21 112 kg (246 lb)   06/30/20 108 kg (237 lb)   08/09/19 104 kg (230 lb)      Body mass index is 38.53 kg/m².    CC:  Main reason(s) for today's visit: Alcohol Problem      HPI: " "Patient was initially scheduled for annual physical today, which he was unaware of.  Instead, he would like to discuss concerns about his alcohol use disorder.     Seeing psychiatry for Bipolar affective disorder. Last drink was last night (2-3 beers). Apparently psychiatry is not aware that he has started drinking again.   Had quit for about 2 months last year. Quit on his own (had been drinking pint of tequila daily) at that point.  Did not have any withdrawal issues at that point.  Has been drinking since age [age]. Father has history of alcohol abuse, patient reports \"still drinks every now and then\".     Drinking 1 pint Vodka/day or 2-3 beers/day. Recent death of friend due to heart failure from ETOH has made him more concerned about his health.   Has been seeing his current psychiatrist for about 2 years (Dr. Fernandez) from [facility].     Quit smoking cigarettes \"months ago\".       Patient Care Team:  Lina Whitney APRN as PCP - General (Internal Medicine)    ____________________________________________________________________    REVIEW OF SYSTEMS    Review of Systems   Gastrointestinal: Negative for nausea and vomiting.   Psychiatric/Behavioral: Positive for dysphoric mood. Negative for sleep disturbance and suicidal ideas. The patient is nervous/anxious.          PHYSICAL EXAMINATION    Physical Exam  Vitals signs and nursing note reviewed.   Constitutional:       General: He is not in acute distress.     Appearance: He is well-developed. He is not ill-appearing.   Cardiovascular:      Rate and Rhythm: Normal rate and regular rhythm.      Heart sounds: Normal heart sounds, S1 normal and S2 normal. No murmur.   Pulmonary:      Effort: Pulmonary effort is normal.      Breath sounds: Normal breath sounds. No decreased breath sounds, wheezing, rhonchi or rales.   Skin:     General: Skin is warm and dry.   Neurological:      Mental Status: He is alert and oriented to person, place, and time. "   Psychiatric:         Mood and Affect: Mood is anxious.         Speech: Speech normal.         Behavior: Behavior normal. Behavior is cooperative.         Thought Content: Thought content normal.         Judgment: Judgment normal.         REVIEWED DATA:    Labs:     No results found for: NA, K, AST, ALT, BUN, CREATININE, EGFRIFNONA, EGFRIFAFRI    No results found for: HGBA1C, GLUCOSE, MICROALBUR    No results found for: LDL, HDL, TRIG, CHOLHDLRATIO    No results found for: TSH, FREET4    No results found for: WBC, HGB, PLT    No results found for: PROTEIN, GLUCOSEU, BLOODU, NITRITEU, LEUKOCYTESUR    Imaging:         Medical Tests:         Summary of old records / correspondence / consultant report:         Request outside records:         ALLERGIES  No Known Allergies     PFSH:     The following portions of the patient's history were reviewed and updated as appropriate: Allergies / Current Medications / Past Medical History / Surgical History / Social History / Family History    PROBLEM LIST   Patient Active Problem List   Diagnosis   • Bipolar affective disorder (CMS/HCC)   • Panic disorder   • Dysfunctional alcohol use       PAST MEDICAL HISTORY  Past Medical History:   Diagnosis Date   • ADHD (attention deficit hyperactivity disorder)     tried aderall and vyvanse   • Bipolar 1 disorder (CMS/HCC)    • GERD (gastroesophageal reflux disease)        SURGICAL HISTORY  Past Surgical History:   Procedure Laterality Date   • ENDOSCOPY     • WISDOM TOOTH EXTRACTION         SOCIAL HISTORY  Social History     Socioeconomic History   • Marital status:      Spouse name: Not on file   • Number of children: Not on file   • Years of education: Not on file   • Highest education level: Not on file   Tobacco Use   • Smoking status: Current Every Day Smoker     Packs/day: 1.00     Years: 5.00     Pack years: 5.00     Types: Cigarettes   • Smokeless tobacco: Never Used   Substance and Sexual Activity   • Alcohol use: Yes      "Alcohol/week: 7.0 standard drinks     Types: 7 Shots of liquor per week     Comment: \"four marely\"    • Drug use: Yes     Types: Marijuana   • Sexual activity: Yes     Partners: Female     Birth control/protection: OCP       FAMILY HISTORY  Family History   Problem Relation Age of Onset   • Cancer Mother         ovarian   • Hypertension Father    • Bipolar disorder Father    • Post-traumatic stress disorder Father    • Alcohol abuse Father    • Diabetes Paternal Uncle    • Migraines Sister    • Heart disease Paternal Grandmother    • Colon cancer Neg Hx    • Colon polyps Neg Hx    • Breast cancer Neg Hx    • Prostate cancer Neg Hx    • Testicular cancer Neg Hx          **Dragon Disclaimer:   Much of this encounter note is an electronic transcription/translation of spoken language to printed text. The electronic translation of spoken language may permit erroneous, or at times, nonsensical words or phrases to be inadvertently transcribed. Although I have reviewed the note for such errors, some may still exist.         "

## 2021-01-12 ENCOUNTER — TELEPHONE (OUTPATIENT)
Dept: INTERNAL MEDICINE | Age: 31
End: 2021-01-12

## 2021-01-12 DIAGNOSIS — E55.9 VITAMIN D DEFICIENCY: ICD-10-CM

## 2021-01-12 DIAGNOSIS — E53.8 LOW FOLATE: ICD-10-CM

## 2021-01-12 DIAGNOSIS — F10.10 DYSFUNCTIONAL ALCOHOL USE: Primary | ICD-10-CM

## 2021-01-12 RX ORDER — MULTIVIT-MIN/IRON/FOLIC ACID/K 18-600-40
2000 CAPSULE ORAL DAILY
Qty: 30 CAPSULE | COMMUNITY
Start: 2021-01-12

## 2021-01-12 RX ORDER — FOLIC ACID 1 MG/1
1 TABLET ORAL DAILY
Qty: 90 TABLET | Refills: 1 | Status: SHIPPED | OUTPATIENT
Start: 2021-01-12

## 2021-01-12 NOTE — TELEPHONE ENCOUNTER
----- Message from TALIB Fernandes sent at 1/12/2021  6:57 AM EST -----  Please call patient with results and send result letter to home address.    Alireza:     Here are the results of your labs from your visit.    1) 2 of your liver enzymes are slightly elevated.  This is likely due to excessive alcohol use.  Please significantly reduce alcohol intake as we discussed.  These numbers should improve with reduction of alcohol.  Please also avoid use of Tylenol over-the-counter or Tylenol containing products as this can also affect her liver function.    2) vitamin D level is slightly low.  Vitamin D is crucial for calcium absorption and bone strength.  Recommend start taking over-the-counter vitamin D3 2000 units daily.    3) your folate level is low.  This is likely due to excessive alcohol intake.  I am going to start you on prescription folate replacement.  Please continue your vitamin B12 as you have been taking as your vitamin B12 level is okay.    All other labs are within acceptable range. Please continue current medications as prescribed and follow up as scheduled.     Lina MAYERS

## 2021-05-24 ENCOUNTER — OFFICE VISIT (OUTPATIENT)
Dept: INTERNAL MEDICINE | Age: 31
End: 2021-05-24

## 2021-05-24 VITALS
OXYGEN SATURATION: 99 % | WEIGHT: 226 LBS | BODY MASS INDEX: 35.47 KG/M2 | HEIGHT: 67 IN | TEMPERATURE: 96.9 F | SYSTOLIC BLOOD PRESSURE: 120 MMHG | DIASTOLIC BLOOD PRESSURE: 76 MMHG | HEART RATE: 87 BPM

## 2021-05-24 DIAGNOSIS — R68.82 LOW LIBIDO: Primary | ICD-10-CM

## 2021-05-24 PROCEDURE — 99213 OFFICE O/P EST LOW 20 MIN: CPT | Performed by: NURSE PRACTITIONER

## 2021-05-24 RX ORDER — BREXPIPRAZOLE 1 MG/1
TABLET ORAL
COMMUNITY
Start: 2021-05-19

## 2021-05-24 NOTE — PROGRESS NOTES
"Chief Complaint  Other (Libido issues )    Subjective          Alireza Carlos presents to [facility]  History of Present Illness     Patient c/o low sex drive/libido issues \"for awhile\". Not having erectile dysfunction. Reports that \"motivation\" occurs even not concerning wife.   Recently switched from abilify to Rexulti per psychiatry, reports libido was an issue prior to this switch.  He is also currently taking venlafaxine and Lamictal.  Concerned this may be a low testosterone issue. Reports fatigue, hot flashes.     Reports he stopped drinking completely 2 months ago on his own. Weight down 20 lbs since last office visit.   Objective   Vital Signs:   /76   Pulse 87   Temp 96.9 °F (36.1 °C) (Temporal)   Ht 170.2 cm (67.01\")   Wt 103 kg (226 lb)   SpO2 99%   BMI 35.39 kg/m²     Physical Exam  Vitals and nursing note reviewed.   Constitutional:       Appearance: He is well-developed.   Neurological:      Mental Status: He is alert and oriented to person, place, and time. He is not disoriented.   Psychiatric:         Attention and Perception: He is attentive.         Speech: Speech normal.         Behavior: Behavior normal. Behavior is cooperative.         Thought Content: Thought content normal.         Judgment: Judgment normal.        Result Review :   The following data was reviewed by: TALIB Fernandes on 05/24/2021:  Common labs    Common Labsle 1/11/21 1/11/21 1/11/21 1/11/21    [id number] 0846 [id number]   Glucose  100 (A)     BUN  13     Creatinine  0.81     eGFR Non  Am  113     eGFR African Am  137     Sodium  140     Potassium  4.0     Chloride  103     Calcium  9.3     Total Protein  5.9 (A)     Albumin  4.20     Total Bilirubin  0.2     Alkaline Phosphatase  132 (A)     AST (SGOT)  39     ALT (SGPT)  50 (A)     WBC 7.78      Hemoglobin 14.7      Hematocrit 41.7      Platelets 277      Total Cholesterol    192   Triglycerides    144   HDL Cholesterol    54 "   LDL Cholesterol     113 (A)   Hemoglobin A1C   5.00    (A) Abnormal value       Comments are available for some flowsheets but are not being displayed.                  Assessment and Plan    Diagnoses and all orders for this visit:    1. Low libido (Primary)  Check early morning testosterone and thyroid levels at patient's convenience.  Discussed with patient if low, would need second recheck as well as evaluation of FSH and LH to determine cause of hypogonadism.  Adore with patient if testosterone levels are normal, I would suspect his concerns are related to his psychiatric medications as these all have the potential to cause sexual dysfunction.  He would then need to discuss this with his psychiatrist to see if any changes could be made that may improve his quality of life in regards to sexual libido.     -     Testosterone, Free, Total; Future  -     TSH+Free T4; Future      Follow Up   Return for Non-fasting lab (DONE PRIOR TO 9 AM) AT CONVENIENCE .  Patient was given instructions and counseling regarding his condition or for health maintenance advice. Please see specific information pulled into the AVS if appropriate.

## 2024-11-17 ENCOUNTER — HOSPITAL ENCOUNTER (EMERGENCY)
Facility: HOSPITAL | Age: 34
Discharge: HOME | End: 2024-11-17
Attending: EMERGENCY MEDICINE | Admitting: EMERGENCY MEDICINE
Payer: COMMERCIAL

## 2024-11-17 VITALS
SYSTOLIC BLOOD PRESSURE: 144 MMHG | OXYGEN SATURATION: 99 % | BODY MASS INDEX: 37.04 KG/M2 | DIASTOLIC BLOOD PRESSURE: 87 MMHG | HEIGHT: 67 IN | TEMPERATURE: 98.7 F | HEART RATE: 77 BPM | WEIGHT: 236 LBS | RESPIRATION RATE: 18 BRPM

## 2024-11-17 DIAGNOSIS — N20.0 NEPHROLITHIASIS: ICD-10-CM

## 2024-11-17 DIAGNOSIS — R10.9 FLANK PAIN: Primary | ICD-10-CM

## 2024-11-17 LAB
ALBUMIN SERPL-MCNC: 4.6 G/DL (ref 3.5–5.2)
ALBUMIN/GLOB SERPL: 2.1 G/DL
ALP SERPL-CCNC: 147 U/L (ref 39–117)
ALT SERPL W P-5'-P-CCNC: 28 U/L (ref 1–41)
ANION GAP SERPL CALCULATED.3IONS-SCNC: 9.2 MMOL/L (ref 5–15)
AST SERPL-CCNC: 17 U/L (ref 1–40)
BACTERIA UR QL AUTO: NORMAL /HPF
BASOPHILS # BLD AUTO: 0.02 10*3/MM3 (ref 0–0.2)
BASOPHILS NFR BLD AUTO: 0.2 % (ref 0–1.5)
BILIRUB SERPL-MCNC: 0.4 MG/DL (ref 0–1.2)
BILIRUB UR QL STRIP: NEGATIVE
BUN SERPL-MCNC: 14 MG/DL (ref 6–20)
BUN/CREAT SERPL: 15.4 (ref 7–25)
CALCIUM SPEC-SCNC: 9.3 MG/DL (ref 8.6–10.5)
CHLORIDE SERPL-SCNC: 104 MMOL/L (ref 98–107)
CLARITY UR: CLEAR
CO2 SERPL-SCNC: 26.8 MMOL/L (ref 22–29)
COLOR UR: YELLOW
CREAT SERPL-MCNC: 0.91 MG/DL (ref 0.76–1.27)
DEPRECATED RDW RBC AUTO: 38.3 FL (ref 37–54)
EGFRCR SERPLBLD CKD-EPI 2021: 114.1 ML/MIN/1.73
EOSINOPHIL # BLD AUTO: 0 10*3/MM3 (ref 0–0.4)
EOSINOPHIL NFR BLD AUTO: 0 % (ref 0.3–6.2)
ERYTHROCYTE [DISTWIDTH] IN BLOOD BY AUTOMATED COUNT: 12.3 % (ref 12.3–15.4)
GLOBULIN UR ELPH-MCNC: 2.2 GM/DL
GLUCOSE SERPL-MCNC: 98 MG/DL (ref 65–99)
GLUCOSE UR STRIP-MCNC: NEGATIVE MG/DL
HCT VFR BLD AUTO: 43.2 % (ref 37.5–51)
HGB BLD-MCNC: 14.7 G/DL (ref 13–17.7)
HGB UR QL STRIP.AUTO: ABNORMAL
HYALINE CASTS UR QL AUTO: NORMAL /LPF
IMM GRANULOCYTES # BLD AUTO: 0.02 10*3/MM3 (ref 0–0.05)
IMM GRANULOCYTES NFR BLD AUTO: 0.2 % (ref 0–0.5)
KETONES UR QL STRIP: NEGATIVE
LEUKOCYTE ESTERASE UR QL STRIP.AUTO: NEGATIVE
LIPASE SERPL-CCNC: 45 U/L (ref 13–60)
LYMPHOCYTES # BLD AUTO: 1.46 10*3/MM3 (ref 0.7–3.1)
LYMPHOCYTES NFR BLD AUTO: 15.1 % (ref 19.6–45.3)
MCH RBC QN AUTO: 28.8 PG (ref 26.6–33)
MCHC RBC AUTO-ENTMCNC: 34 G/DL (ref 31.5–35.7)
MCV RBC AUTO: 84.7 FL (ref 79–97)
MONOCYTES # BLD AUTO: 0.58 10*3/MM3 (ref 0.1–0.9)
MONOCYTES NFR BLD AUTO: 6 % (ref 5–12)
NEUTROPHILS NFR BLD AUTO: 7.59 10*3/MM3 (ref 1.7–7)
NEUTROPHILS NFR BLD AUTO: 78.5 % (ref 42.7–76)
NITRITE UR QL STRIP: NEGATIVE
PH UR STRIP.AUTO: 6 [PH] (ref 5–8)
PLATELET # BLD AUTO: 330 10*3/MM3 (ref 140–450)
PMV BLD AUTO: 10 FL (ref 6–12)
POTASSIUM SERPL-SCNC: 3.9 MMOL/L (ref 3.5–5.2)
PROT SERPL-MCNC: 6.8 G/DL (ref 6–8.5)
PROT UR QL STRIP: NEGATIVE
RBC # BLD AUTO: 5.1 10*6/MM3 (ref 4.14–5.8)
RBC # UR STRIP: NORMAL /HPF
REF LAB TEST METHOD: NORMAL
SODIUM SERPL-SCNC: 140 MMOL/L (ref 136–145)
SP GR UR STRIP: 1.01 (ref 1–1.03)
SQUAMOUS #/AREA URNS HPF: NORMAL /HPF
UROBILINOGEN UR QL STRIP: ABNORMAL
WBC # UR STRIP: NORMAL /HPF
WBC NRBC COR # BLD AUTO: 9.67 10*3/MM3 (ref 3.4–10.8)

## 2024-11-17 PROCEDURE — 80053 COMPREHEN METABOLIC PANEL: CPT | Performed by: EMERGENCY MEDICINE

## 2024-11-17 PROCEDURE — 36415 COLL VENOUS BLD VENIPUNCTURE: CPT

## 2024-11-17 PROCEDURE — 99283 EMERGENCY DEPT VISIT LOW MDM: CPT

## 2024-11-17 PROCEDURE — 81001 URINALYSIS AUTO W/SCOPE: CPT | Performed by: EMERGENCY MEDICINE

## 2024-11-17 PROCEDURE — 83690 ASSAY OF LIPASE: CPT | Performed by: EMERGENCY MEDICINE

## 2024-11-17 PROCEDURE — 99284 EMERGENCY DEPT VISIT MOD MDM: CPT | Performed by: EMERGENCY MEDICINE

## 2024-11-17 PROCEDURE — 85025 COMPLETE CBC W/AUTO DIFF WBC: CPT | Performed by: EMERGENCY MEDICINE

## 2024-11-17 RX ORDER — IBUPROFEN 600 MG/1
600 TABLET, FILM COATED ORAL EVERY 8 HOURS PRN
Qty: 12 TABLET | Refills: 0 | Status: SHIPPED | OUTPATIENT
Start: 2024-11-17

## 2024-11-17 NOTE — DISCHARGE INSTRUCTIONS
Please strain all urine.  Recommend water and plenty of rest.  If you develop severe pain not responsive to prescription Motrin or intractable pain rated above 7 especially associated with fever or vomiting or retention of urine greater than 6 hours, please return to the nearest emergency room.  Please cut back on soda and switch to water and Gatorade 0.  Avoid over-the-counter NSAIDs while taking prescription Motrin.

## 2024-11-17 NOTE — FSED PROVIDER NOTE
Subjective   History of Present Illness  34-year-old gentleman with a history of ADHD, bipolar, GERD.  Patient comes in today complaining of bilateral flank pain.  Patient denies waking up doing any strenuous activity.  No recent trauma or fall.  Denies any heavy shoveling or lifting.  States he had 7 out of 10 stabbing bilateral flank pain with radiation towards his epigastrium that started early this morning.  He took some over-the-counter medicines and reports the pain has nearly resolved.  Pain is currently rated 3 out of 10.  It is a mild ache.  States he looked on the Internet and was concerned about possible gallbladder disease.  Patient reports normal urine output, normal stools and no constipation nausea vomiting or diarrhea.  He denies any fever, chills, hematuria or any other associated symptoms.  Denies any alcohol or illicits.    History provided by:  Patient, medical records and spouse      Review of Systems   Constitutional: Negative.    HENT: Negative.     Eyes: Negative.    Respiratory: Negative.     Cardiovascular: Negative.    Gastrointestinal:  Positive for abdominal pain.   Endocrine: Negative.    Genitourinary:  Positive for flank pain.   Skin: Negative.    Allergic/Immunologic: Negative.    Neurological: Negative.    All other systems reviewed and are negative.      Past Medical History:   Diagnosis Date    ADHD (attention deficit hyperactivity disorder)     tried aderall and vyvanse    Bipolar 1 disorder     GERD (gastroesophageal reflux disease)        No Known Allergies    Past Surgical History:   Procedure Laterality Date    ENDOSCOPY      WISDOM TOOTH EXTRACTION         Family History   Problem Relation Age of Onset    Cancer Mother         ovarian    Hypertension Father     Bipolar disorder Father     Post-traumatic stress disorder Father     Alcohol abuse Father     Diabetes Paternal Uncle     Migraines Sister     Heart disease Paternal Grandmother     Colon cancer Neg Hx     Colon  "polyps Neg Hx     Breast cancer Neg Hx     Prostate cancer Neg Hx     Testicular cancer Neg Hx        Social History     Socioeconomic History    Marital status:    Tobacco Use    Smoking status: Every Day     Current packs/day: 1.00     Average packs/day: 1 pack/day for 5.0 years (5.0 ttl pk-yrs)     Types: Cigarettes    Smokeless tobacco: Never   Substance and Sexual Activity    Alcohol use: Yes     Alcohol/week: 7.0 standard drinks of alcohol     Types: 7 Shots of liquor per week     Comment: \"four marely\"     Drug use: Yes     Types: Marijuana    Sexual activity: Yes     Partners: Female     Birth control/protection: OCP           Objective   Physical Exam  Vitals and nursing note reviewed.   Constitutional:       General: He is in acute distress (Mild distress).      Appearance: Normal appearance. He is not ill-appearing or toxic-appearing.   HENT:      Head: Normocephalic and atraumatic.      Nose: Nose normal.      Mouth/Throat:      Mouth: Mucous membranes are moist.      Pharynx: Oropharynx is clear. No oropharyngeal exudate or posterior oropharyngeal erythema.   Eyes:      Extraocular Movements: Extraocular movements intact.      Conjunctiva/sclera: Conjunctivae normal.      Pupils: Pupils are equal, round, and reactive to light.   Cardiovascular:      Rate and Rhythm: Normal rate and regular rhythm.      Pulses: Normal pulses.      Heart sounds: Normal heart sounds. No murmur heard.  Pulmonary:      Effort: Pulmonary effort is normal. No respiratory distress.      Breath sounds: Normal breath sounds. No stridor. No wheezing, rhonchi or rales.   Abdominal:      General: Abdomen is flat. Bowel sounds are normal.      Palpations: Abdomen is soft.      Tenderness: There is no abdominal tenderness. There is right CVA tenderness. There is no left CVA tenderness.   Musculoskeletal:         General: Normal range of motion.      Cervical back: Normal range of motion. No rigidity or tenderness.   Skin:     " General: Skin is warm and dry.      Capillary Refill: Capillary refill takes less than 2 seconds.   Neurological:      General: No focal deficit present.      Mental Status: He is alert and oriented to person, place, and time. Mental status is at baseline.      Cranial Nerves: No cranial nerve deficit.      Sensory: No sensory deficit.      Motor: No weakness.      Coordination: Coordination normal.      Gait: Gait normal.   Psychiatric:         Mood and Affect: Mood normal.         Behavior: Behavior normal.         Thought Content: Thought content normal.         Judgment: Judgment normal.         Procedures           ED Course  ED Course as of 11/17/24 1533   Sun Nov 17, 2024   1523 Abdomen soft, tolerating p.o. and feeling better.  Labs were independently reviewed and interpreted by me.  There is minimal elevation of alkaline phosphatase and trace hematuria.  Will defer imaging at this time.  I explained there is a small possibility of nephrolithiasis however even if this was the case, imaging will not change disposition and a well-appearing patient who likely will go home.  Will defer imaging at this time.  Patient was instructed to return for severe pain especially associated with vomiting, fever or any other concerns.  Will treat presumptively for renal stone with high-dose Motrin and water and patient was encouraged to cut back on soda.  His significant other reports he drinks lots of soda. [PP]      ED Course User Index  [PP] Tyrell Dennis MD                                           Medical Decision Making  I suspect back strain.  Patient also reports he recently got off a GLP-1 medication about a month ago.  Will obtain basic labs to exclude leukocytosis, acute kidney injury, dehydration.  His abdomen is totally soft.  In the absence of lab abnormality, imaging is not warranted at this time.  Also doubt kidney stone as his pain is minimal.  However, if he has hematuria, he can follow-up as an  outpatient for imaging.  I explained to patient that imaging will not change his disposition today unless he has significant lab abnormality.  Patient is agreeable to this.  Vitals interpreted by me are normal with exception of uncontrolled hypertension.  He will be referred to PCP regarding this finding within 1 month.  He is nearly pain-free and no pain medications are required at this time.    Problems Addressed:  Flank pain: complicated acute illness or injury  Nephrolithiasis: complicated acute illness or injury    Amount and/or Complexity of Data Reviewed  Independent Historian: spouse  External Data Reviewed: labs and notes.  Labs: ordered. Decision-making details documented in ED Course.  Radiology: ordered and independent interpretation performed. Decision-making details documented in ED Course.    Risk  OTC drugs.  Prescription drug management.        Final diagnoses:   Flank pain   Nephrolithiasis       ED Disposition  ED Disposition       ED Disposition   Discharge    Condition   Stable    Comment   --               Alejandra Nunes, APRN  [address] [address]  [phone]    Schedule an appointment as soon as possible for a visit in 3 days           Medication List        New Prescriptions      ibuprofen 600 MG tablet  Commonly known as: ADVIL,MOTRIN  Take 1 tablet by mouth Every 8 (Eight) Hours As Needed for Mild Pain.               Where to Get Your Medications        These medications were sent to [id number] - [place], [place] - [address] AT [facility] OF [address] [place] [address] [address] - [phone] [facility] [phone] FX  [address] [place] [address]      Phone: [phone]   ibuprofen 600 MG tablet